# Patient Record
(demographics unavailable — no encounter records)

---

## 2025-07-28 NOTE — ASSESSMENT
[FreeTextEntry1] : 32 yo F with symptomatic pannus who has failed conservative management and is indicated for panniculectomy.   - recommend additional weight loss to reduce surgical risk, current BMI 38 - not yet a good surgical candidate until weight stable and BMI < 60  - recommend weight loss, current BMI 38.6  - reviewed improved post-op cosmesis and reduced surgical complications with pre-op weight loss - recommend weight loss to BMI =/< 30 (~ 165 lbs) - reviewed weight loss timeline -- weight loss and then stability x 6 months before surgical readiness - reviewed option of bariatric surgery referral if weight plateau prior to reaching goal weight - all questions were answered - follow up in after weight loss and stability x 6 months

## 2025-07-28 NOTE — HISTORY OF PRESENT ILLNESS
[FreeTextEntry1] : 30 yo F  () with PMHx of GERD and Asthma who presents today for panniculectomy consultation. Pt reports non-surgical 30 lbs + weight loss with diet and exercise over the past 2 years now stable for at least 6 months. She has a goal weight of 180 lbs to reach.  She endorses lower back pain, skin rashes and hanging pannus causing frequent urination and sexual side effects due to hanging mons. Pt has used several OTC topical ointments and powders for skin rashes as well as prescribed Nystatin tx from her PMD with recurrence. She takes extra strength Tylenol for pain. Pt has failed conservative management and is being referred here by PMD for surgical options to alleviate her symptoms.   Here for surgical evaluation for abdominal pannus and discussion of treatment options.  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

## 2025-07-28 NOTE — PHYSICAL EXAM
[de-identified] : well-developed pleasant female, NAD [de-identified] : NC/AT [de-identified] : supple [de-identified] : nonlabored breathing  [de-identified] : MARYR [de-identified] : soft, nontender with extensive skin laxity and hanging Grade 2-3 pannus with striae, rectus diastasis 8 cm